# Patient Record
Sex: FEMALE | Race: WHITE | NOT HISPANIC OR LATINO | Employment: FULL TIME | URBAN - NONMETROPOLITAN AREA
[De-identification: names, ages, dates, MRNs, and addresses within clinical notes are randomized per-mention and may not be internally consistent; named-entity substitution may affect disease eponyms.]

---

## 2017-12-21 PROBLEM — K81.0 ACUTE CHOLECYSTITIS: Status: ACTIVE | Noted: 2017-12-21

## 2019-09-09 PROBLEM — Z11.51 SCREENING FOR HPV (HUMAN PAPILLOMAVIRUS): Status: ACTIVE | Noted: 2019-09-09

## 2019-09-09 PROBLEM — Z85.820 HX OF MELANOMA OF SKIN: Status: ACTIVE | Noted: 2019-09-09

## 2019-09-09 PROBLEM — Z01.419 WELL WOMAN EXAM: Status: ACTIVE | Noted: 2019-09-09

## 2020-09-21 PROBLEM — N95.0 POST-MENOPAUSE BLEEDING: Status: ACTIVE | Noted: 2020-09-21

## 2020-09-21 PROBLEM — Z56.6 WORK-RELATED STRESS: Status: ACTIVE | Noted: 2020-09-21

## 2020-09-21 PROBLEM — Z01.411 ENCOUNTER FOR WELL WOMAN EXAM WITH ABNORMAL FINDINGS: Status: ACTIVE | Noted: 2020-09-21

## 2020-09-29 PROBLEM — Z80.0 FAMILY HISTORY OF COLON CANCER IN MOTHER: Status: ACTIVE | Noted: 2020-09-29

## 2021-09-15 NOTE — PROGRESS NOTES
Subjective:   Chief Complaint:   Chief Complaint   Patient presents with   • Abnormal EKG     This evaluation was conducted via Zoom using secure and encrypted videoconferencing technology. The patient was in a private location in the state of [place].    The patient's identity was confirmed and verbal consent was obtained for this virtual visit.      Neelima Lares is a 61 y.o. female who is referred by JOYCE Correa for chest pain and abnormal ECG, SVT/PAT, PACs, PVCs, left carotid artery plaque, FH CAD.    She was feeling fatigued, trouble with energy.  PCP got ECG, saw runs of SVT.    In hindsight, she did note palpitations, fluttering in the chest.  Sometimes gets chest tightness up near her throat.  Recalls being under a lot of stress.  Tried to wear monitor, it did not work, wore another, showed frequent SVT/AT.  Had stress test also, no rhythm changes, just AMARAL, only 6 min.    Probably preHTN.    Had HLP, LDL is a little elevated.  Has had [facility] vascular screening, email said no significant PAD, just mild carotid artery plaque.    Not limited by chest pain, pressure or tightness with activity.   No significant dyspnea on exertion, orthopnea or lower extremity swelling.   No significant lightheadedness, or presyncope/syncope.   No symptoms of leg claudication.   No stroke/TIA like symptoms.    No family history of premature coronary artery disease.  Father had valve replacement, PM, has passed away.  Brother with MI/stents, age at 45.  Former smoker, quit before age 24.   No history of diabetes.  No history of autoimmune disease such as lupus or rheumatoid arthritis.  No chronic kidney disease.  No ETOH overuse.  No caffeine overuse. 2 cups of coffee daily.  No recreation substance use.  No hx asthma, possible reactive airway disease.    Lives in [place].  Working.  Son and granddaughter live with them part time, she is getting her granddaughter to [employer] for school.    DATA REVIEWED by  me:  ECG (my personal interpretation) 6/21/2021  Sinus, rate 75, runs of SVT    Heart monitor 7/26/2021, 24 hours  Average heart rate 81, range  bpm  10% PAC burden, 824 episodes of atrial tachycardia, up to 14 beats, rare PVCs. No symptoms reported.    Echo pending    Treadmill Mayur [place] 7-8-21  Went 6 min no ST changes, no arrhthymias, no ECG changes.  Did not AMARAL but no CP.  Normal BP response.      Most recent labs:       Lab Results   Component Value Date/Time    WBC 6.8 06/24/2021 08:17 AM    HEMOGLOBIN 15.3 06/24/2021 08:17 AM    HEMATOCRIT 48.1 06/24/2021 08:17 AM    MCV 88.6 06/24/2021 08:17 AM      Lab Results   Component Value Date/Time    SODIUM 141 06/24/2021 08:17 AM    POTASSIUM 4.7 06/24/2021 08:17 AM    CHLORIDE 107 06/24/2021 08:17 AM    CO2 25 06/24/2021 08:17 AM    GLUCOSE 99 06/24/2021 08:17 AM    BUN 22 (H) 06/24/2021 08:17 AM    CREATININE 0.8 06/24/2021 08:17 AM      Lab Results   Component Value Date/Time    ASTSGOT 17 06/24/2021 08:17 AM    ALTSGPT 24 06/24/2021 08:17 AM    ALBUMIN 3.7 06/24/2021 08:17 AM      Lab Results   Component Value Date/Time    CHOLSTRLTOT 194 06/24/2021 08:17 AM     (H) 06/24/2021 08:17 AM    HDL 49.0 06/24/2021 08:17 AM    TRIGLYCERIDE 118 06/24/2021 08:17 AM     No results for input(s): NTPROBNP, TROPONINT in the last 72 hours.      Past Medical History:   Diagnosis Date   • Anxiety    • Cancer (HCC)     melenoma ( 1987)    • Chickenpox    • Fatigue    • History of colonoscopy 2016     Past Surgical History:   Procedure Laterality Date   • JONATHAN BY LAPAROSCOPY  12/22/2017    Procedure: JONATHAN BY LAPAROSCOPY;  Surgeon: Josh Zelaya M.D.;  Location: SURGERY [facility];  Service: General   • TUBAL LIGATION  1995   • MOLE EXCISION      Malignant mole removal and skin graph in 1985     Family History   Problem Relation Age of Onset   • Cancer Mother 42   • Heart Disease Mother    • Heart Disease Father         Valve surgery, PM   • Heart Disease  Brother 48        stent X2   • No Known Problems Daughter    • No Known Problems Daughter    • No Known Problems Son    • No Known Problems Son      Social History     Socioeconomic History   • Marital status:      Spouse name: Not on file   • Number of children: Not on file   • Years of education: Not on file   • Highest education level: Not on file   Occupational History   • Not on file   Tobacco Use   • Smoking status: Former Smoker     Quit date: 2/10/1987     Years since quittin.6   • Smokeless tobacco: Never Used   Vaping Use   • Vaping Use: Never used   Substance and Sexual Activity   • Alcohol use: Yes     Alcohol/week: 0.6 - 1.2 oz     Types: 1 - 2 Glasses of wine per week     Comment: rarely    • Drug use: No   • Sexual activity: Not on file   Other Topics Concern   • Not on file   Social History Narrative   • Not on file     Social Determinants of Health     Financial Resource Strain:    • Difficulty of Paying Living Expenses:    Food Insecurity:    • Worried About Running Out of Food in the Last Year:    • Ran Out of Food in the Last Year:    Transportation Needs:    • Lack of Transportation (Medical):    • Lack of Transportation (Non-Medical):    Physical Activity:    • Days of Exercise per Week:    • Minutes of Exercise per Session:    Stress:    • Feeling of Stress :    Social Connections:    • Frequency of Communication with Friends and Family:    • Frequency of Social Gatherings with Friends and Family:    • Attends Yazdanism Services:    • Active Member of Clubs or Organizations:    • Attends Club or Organization Meetings:    • Marital Status:    Intimate Partner Violence:    • Fear of Current or Ex-Partner:    • Emotionally Abused:    • Physically Abused:    • Sexually Abused:      No Known Allergies    Current Outpatient Medications   Medication Sig Dispense Refill   • ibuprofen (MOTRIN) 200 MG Tab Take 200 mg by mouth every 6 hours as needed.     • metoprolol tartrate (LOPRESSOR) 25  "MG Tab Take 1 Tablet by mouth 2 times a day. 60 Tablet 11     No current facility-administered medications for this visit.       ROS  All others systems reviewed and negative.     Objective:     BP (!) 0/0 (BP Location: Left arm, Patient Position: Sitting, BP Cuff Size: Adult)   Resp 12   Ht 1.6 m (5' 3\")   Wt 81.6 kg (180 lb)  Body mass index is 31.89 kg/m².    Vitals obtained by patient:    Physical Exam:  General: No acute distress. Well nourished.   HEENT: EOM grossly intact, no scleral icterus, no pharyngeal erythema.  Phonation normal.  Neck:  No JVD noted at 90 degrees, trachea midline, no obvious masses, moves freely without pain.  CVS: Pulse as reported by patient, no visible LE edema.  Resp: Unlabored respiratory effort, no cough or audible wheeze  MSK/Ext: No clubbing or cyanosis visible appreciated.  Skin: No rashes in visible areas.  Neurological: CN III-XII grossly intact. No focal deficits.   Psych: A&O x 3, appropriate affect, good judgement, well groomed        Assessment:     1. Other chest pain     2. SVT (supraventricular tachycardia) (HCC)  EC-ECHOCARDIOGRAM COMPLETE W/O CONT    CANCELED: Cardiac Event Monitor   3. Nonspecific abnormal electrocardiogram (ECG) (EKG)     4. Other fatigue     5. Paroxysmal atrial tachycardia (HCC)     6. Premature atrial contraction     7. PVC (premature ventricular contraction)     8. Carotid artery plaque, left     9. Family history of coronary artery disease in brother         Medical Decision Making:  Today's Assessment / Status / Plan:     -Needs echo  -Meds for SVT/PAT, consider meds vs ablation  -Chest tightness probably related to SVT  -LDL is up, has carotid plaque, statin in recommended, corby with FH  -APN 4 weeks after trying meds    Written instructions given today:    -Your heart monitor showed SVT= supraventricular tachycardia.  This means a rhythm change coming from the top part of the heart.  Your type of SVT looked most consistent with a possible " "atrial tachycardia.    -In general, SVT and atrial tachycardia are not harmful but can cause significant symptoms.    -Our goal is to suppress the rhythm change so that you feel better.  The easiest way to do this is to take medications.  Another option would be ablation with an electrophysiologist cardiologist which is a surgical procedure intended to make the rhythm change go away so you will have to take medications.    -We typically start with low-dose metoprolol to suppress the palpitations.  Metoprolol can cause fatigue and lower blood pressure and slower heart rate.    -Another option is Cardizem, a different type of medication called calcium channel blocker.    -If you find the medication helpful then stay on it, if you have side effects and do not feel better taking it then do not take it.    -I am going to start a medication called metoprolol tartrate 25 mg a.m. and p.m.  This is a 12-hour medication.  It is entirely up to you whether you want to take it every day or as needed.  You can also try taking just half a tablet a.m. and p.m.    -The medication can be taken every day to suppress the heart palpitations or it can be taken as needed for what I call a \"bad palpitation day.\"  We call this a pill in pocket approach.    -You should not have significant side effects from the medications, they are not worth taking if you have significant side effects.    -On another note, your LDL cholesterol is elevated and if you have a little bit of plaque in your carotid artery, that is an indicator of vascular disease which can lead to heart attack like your brother had.  The treatment for this would be to start a low-dose cholesterol medication such as atorvastatin 10 mg once daily.  You can talk with Oralia about this.    -I can refer you to an electrophysiology cardiologist anytime to discuss ablation.  You can read about SVT and atrial tachycardia ablation online.    -Come back and see my nurse practitioner in 1 " to 2 months to see how you are doing on this medication.    -We do need an echocardiogram which is an ultrasound of the heart to show me the shape and structure of the heart to ensure you are dealing with a structurally normal heart.  This can be done in [place] or [facility] in about 4 weeks (around 10/15/2021).    It is my pleasure to participate in the care of Ms. Lares.  Please do not hesitate to contact me with questions or concerns.    Judith Vo MD, [facility]  Cardiologist [facility]    Please note that this dictation was created using voice recognition software. I have made every reasonable attempt to correct obvious errors, but it is possible there are errors of grammar and possibly content that I did not discover before finalizing the note.

## 2021-09-17 ENCOUNTER — TELEMEDICINE (OUTPATIENT)
Dept: CARDIOLOGY | Facility: CLINIC | Age: 61
End: 2021-09-17
Attending: NURSE PRACTITIONER
Payer: COMMERCIAL

## 2021-09-17 VITALS — HEIGHT: 63 IN | RESPIRATION RATE: 12 BRPM | WEIGHT: 180 LBS | BODY MASS INDEX: 31.89 KG/M2

## 2021-09-17 DIAGNOSIS — I47.10 SVT (SUPRAVENTRICULAR TACHYCARDIA) (HCC): ICD-10-CM

## 2021-09-17 DIAGNOSIS — Z82.49 FAMILY HISTORY OF CORONARY ARTERY DISEASE IN BROTHER: ICD-10-CM

## 2021-09-17 DIAGNOSIS — R94.31 NONSPECIFIC ABNORMAL ELECTROCARDIOGRAM (ECG) (EKG): ICD-10-CM

## 2021-09-17 DIAGNOSIS — I49.1 PREMATURE ATRIAL CONTRACTION: ICD-10-CM

## 2021-09-17 DIAGNOSIS — R07.89 OTHER CHEST PAIN: ICD-10-CM

## 2021-09-17 DIAGNOSIS — R53.83 OTHER FATIGUE: ICD-10-CM

## 2021-09-17 DIAGNOSIS — I47.19 PAROXYSMAL ATRIAL TACHYCARDIA (HCC): ICD-10-CM

## 2021-09-17 DIAGNOSIS — I65.22 CAROTID ARTERY PLAQUE, LEFT: ICD-10-CM

## 2021-09-17 DIAGNOSIS — I49.3 PVC (PREMATURE VENTRICULAR CONTRACTION): ICD-10-CM

## 2021-09-17 PROCEDURE — 99204 OFFICE O/P NEW MOD 45 MIN: CPT | Mod: 95 | Performed by: INTERNAL MEDICINE

## 2021-09-17 RX ORDER — IBUPROFEN 200 MG
200 TABLET ORAL EVERY 6 HOURS PRN
COMMUNITY

## 2021-09-17 ASSESSMENT — FIBROSIS 4 INDEX: FIB4 SCORE: 0.65

## 2021-09-17 NOTE — LETTER
[facility] [place] [address],   2nd Floor  [place] [address]  Phone: [phone]  Fax: [phone]              Neelima Lares  1960    Encounter Date: 9/17/2021    Judith Vo M.D.          PROGRESS NOTE:  Subjective:   Chief Complaint: No chief complaint on file.    This evaluation was conducted via Zoom using secure and encrypted videoconferencing technology. The patient was in a private location in the [facility].    The patient's identity was confirmed and verbal consent was obtained for this virtual visit.      Neelima Lares is a 61 y.o. female who is referred by JOYCE Correa for chest pain and abnormal ECG, SVT.    DATA REVIEWED by me:  ECG (my personal interpretation) 6/21/2021  Sinus, rate 75, runs of SVT    Echo pending    Most recent labs:       Lab Results   Component Value Date/Time    WBC 6.8 06/24/2021 08:17 AM    HEMOGLOBIN 15.3 06/24/2021 08:17 AM    HEMATOCRIT 48.1 06/24/2021 08:17 AM    MCV 88.6 06/24/2021 08:17 AM      Lab Results   Component Value Date/Time    SODIUM 141 06/24/2021 08:17 AM    POTASSIUM 4.7 06/24/2021 08:17 AM    CHLORIDE 107 06/24/2021 08:17 AM    CO2 25 06/24/2021 08:17 AM    GLUCOSE 99 06/24/2021 08:17 AM    BUN 22 (H) 06/24/2021 08:17 AM    CREATININE 0.8 06/24/2021 08:17 AM      Lab Results   Component Value Date/Time    ASTSGOT 17 06/24/2021 08:17 AM    ALTSGPT 24 06/24/2021 08:17 AM    ALBUMIN 3.7 06/24/2021 08:17 AM      Lab Results   Component Value Date/Time    CHOLSTRLTOT 194 06/24/2021 08:17 AM     (H) 06/24/2021 08:17 AM    HDL 49.0 06/24/2021 08:17 AM    TRIGLYCERIDE 118 06/24/2021 08:17 AM     No results for input(s): NTPROBNP, TROPONINT in the last 72 hours.      Past Medical History:   Diagnosis Date   • Anxiety    • Cancer (HCC)     melenoma ( 1987)    • Chickenpox    • Fatigue    • History of colonoscopy 2016     Past Surgical History:   Procedure  Laterality Date   • JONATHAN BY LAPAROSCOPY  2017    Procedure: JONATHAN BY LAPAROSCOPY;  Surgeon: Josh Zelaya M.D.;  Location: SURGERY [facility];  Service: General   • TUBAL LIGATION     • MOLE EXCISION      Malignant mole removal and skin graph in      Family History   Problem Relation Age of Onset   • Cancer Mother 42   • Heart Disease Mother    • Heart Disease Brother 45        stint X2   • No Known Problems Daughter    • No Known Problems Daughter    • No Known Problems Son    • No Known Problems Son      Social History     Socioeconomic History   • Marital status:      Spouse name: Not on file   • Number of children: Not on file   • Years of education: Not on file   • Highest education level: Not on file   Occupational History   • Not on file   Tobacco Use   • Smoking status: Former Smoker     Quit date: 2/10/1987     Years since quittin.6   • Smokeless tobacco: Never Used   Vaping Use   • Vaping Use: Never used   Substance and Sexual Activity   • Alcohol use: Yes     Alcohol/week: 0.6 - 1.2 oz     Types: 1 - 2 Glasses of wine per week     Comment: rarely    • Drug use: No   • Sexual activity: Not on file   Other Topics Concern   • Not on file   Social History Narrative   • Not on file     Social Determinants of Health     Financial Resource Strain:    • Difficulty of Paying Living Expenses:    Food Insecurity:    • Worried About Running Out of Food in the Last Year:    • Ran Out of Food in the Last Year:    Transportation Needs:    • Lack of Transportation (Medical):    • Lack of Transportation (Non-Medical):    Physical Activity:    • Days of Exercise per Week:    • Minutes of Exercise per Session:    Stress:    • Feeling of Stress :    Social Connections:    • Frequency of Communication with Friends and Family:    • Frequency of Social Gatherings with Friends and Family:    • Attends Mandaeism Services:    • Active Member of Clubs or Organizations:    • Attends Club or  Organization Meetings:    • Marital Status:    Intimate Partner Violence:    • Fear of Current or Ex-Partner:    • Emotionally Abused:    • Physically Abused:    • Sexually Abused:      No Known Allergies    No current outpatient medications on file.     No current facility-administered medications for this visit.       ROS  All others systems reviewed and negative.     Objective:     There were no vitals taken for this visit. There is no height or weight on file to calculate BMI.    Vitals obtained by patient:    Physical Exam:  General: No acute distress. Well nourished.   HEENT: EOM grossly intact, no scleral icterus, no pharyngeal erythema.  Phonation normal.  Neck:  No JVD noted at 90 degrees, trachea midline, no obvious masses, moves freely without pain.  CVS: Pulse as reported by patient, no visible LE edema.  Resp: Unlabored respiratory effort, no cough or audible wheeze  MSK/Ext: No clubbing or cyanosis visible appreciated.  Skin: No rashes in visible areas.  Neurological: CN III-XII grossly intact. No focal deficits.   Psych: A&O x 3, appropriate affect, good judgement, well groomed        Assessment:     No diagnosis found.    Medical Decision Making:  Today's Assessment / Status / Plan:     -Needs echo, zio  -APN 4 weeks after eval          Written instructions given today:        No follow-ups on file.    It is my pleasure to participate in the care of Ms. Lares.  Please do not hesitate to contact me with questions or concerns.    Judith Vo MD, [facility]  Cardiologist [facility]    Please note that this dictation was created using voice recognition software. I have made every reasonable attempt to correct obvious errors, but it is possible there are errors of grammar and possibly content that I did not discover before finalizing the note.          Oralia Marie, ALEXRManojN.  [address] [facility] [address]  Via In Basket

## 2021-09-17 NOTE — PATIENT INSTRUCTIONS
"-Your heart monitor showed SVT= supraventricular tachycardia.  This means a rhythm change coming from the top part of the heart.  Your type of SVT looked most consistent with a possible atrial tachycardia.    -In general, SVT and atrial tachycardia are not harmful but can cause significant symptoms.    -Our goal is to suppress the rhythm change so that you feel better.  The easiest way to do this is to take medications.  Another option would be ablation with an electrophysiologist cardiologist which is a surgical procedure intended to make the rhythm change go away so you will have to take medications.    -We typically start with low-dose metoprolol to suppress the palpitations.  Metoprolol can cause fatigue and lower blood pressure and slower heart rate.    -Another option is Cardizem, a different type of medication called calcium channel blocker.    -If you find the medication helpful then stay on it, if you have side effects and do not feel better taking it then do not take it.    -I am going to start a medication called metoprolol tartrate 25 mg a.m. and p.m.  This is a 12-hour medication.  It is entirely up to you whether you want to take it every day or as needed.  You can also try taking just half a tablet a.m. and p.m.    -Your starting dose is very low.  Maximum dose is 100 mg a.m. and p.m. just to put in perspective.    -The medication can be taken every day to suppress the heart palpitations or it can be taken as needed for what I call a \"bad palpitation day.\"  We call this a pill in pocket approach.    -You should not have significant side effects from the medications, they are not worth taking if you have significant side effects.    -On another note, your LDL cholesterol is elevated and if you have a little bit of plaque in your carotid artery, that is an indicator of vascular disease which can lead to heart attack like your brother had.  The treatment for this would be to start a low-dose cholesterol " medication such as atorvastatin 10 mg once daily.  You can talk with Oralia about this.    -I can refer you to an electrophysiology cardiologist anytime to discuss ablation.  You can read about SVT and atrial tachycardia ablation online.    -Come back and see my nurse practitioner in 1 to 2 months to see how you are doing on this medication.    -We do need an echocardiogram which is an ultrasound of the heart to show me the shape and structure of the heart to ensure you are dealing with a structurally normal heart.  This can be done in [place] or [place]

## 2021-10-27 ENCOUNTER — OFFICE VISIT (OUTPATIENT)
Dept: CARDIOLOGY | Facility: PHYSICIAN GROUP | Age: 61
End: 2021-10-27
Payer: COMMERCIAL

## 2021-10-27 VITALS
RESPIRATION RATE: 16 BRPM | OXYGEN SATURATION: 95 % | BODY MASS INDEX: 32.25 KG/M2 | WEIGHT: 182 LBS | HEART RATE: 65 BPM | HEIGHT: 63 IN | SYSTOLIC BLOOD PRESSURE: 102 MMHG | DIASTOLIC BLOOD PRESSURE: 62 MMHG

## 2021-10-27 DIAGNOSIS — E78.5 BORDERLINE HYPERLIPIDEMIA: ICD-10-CM

## 2021-10-27 DIAGNOSIS — I47.10 SVT (SUPRAVENTRICULAR TACHYCARDIA) (HCC): ICD-10-CM

## 2021-10-27 DIAGNOSIS — R00.2 PALPITATIONS: ICD-10-CM

## 2021-10-27 PROCEDURE — 99214 OFFICE O/P EST MOD 30 MIN: CPT | Performed by: NURSE PRACTITIONER

## 2021-10-27 RX ORDER — METOPROLOL TARTRATE 50 MG/1
25 TABLET, FILM COATED ORAL 2 TIMES DAILY
Qty: 100 TABLET | Refills: 3
Start: 2021-10-27 | End: 2022-12-29 | Stop reason: SDUPTHER

## 2021-10-27 ASSESSMENT — ENCOUNTER SYMPTOMS
NAUSEA: 0
FEVER: 0
ORTHOPNEA: 0
PALPITATIONS: 0
BRUISES/BLEEDS EASILY: 0
CHILLS: 0
MYALGIAS: 0
COUGH: 0
LOSS OF CONSCIOUSNESS: 0
SHORTNESS OF BREATH: 0
DIZZINESS: 0
PND: 0
HEADACHES: 0
ABDOMINAL PAIN: 0

## 2021-10-27 ASSESSMENT — FIBROSIS 4 INDEX: FIB4 SCORE: 0.65

## 2021-10-27 NOTE — PROGRESS NOTES
Chief Complaint   Patient presents with   • Follow-Up   • Palpitations   • Supraventricular Tachycardia (SVT)   • Evaluation Of Medication Change       Subjective     Neelima Lares is a 61 y.o. female who presents today for one month follow-up of palpitations/SVT and medication addition evaluation.    Neelima is a 61 year old female who first saw Dr. Vo via Telemedicine on 9/17/2021 for evaluation of palpitations/SVT documented on holter monitor. This all seemed to start after full Covid vaccination in January/February 2021. Low dose Metoprolol 25mg BID was added; echocardiogram was also ordered. She is here today for follow-up.    Generally, she is doing well. At first, she tried taking Metoprolol AM and PM, but it kept her up at night. She is now taking 25mg AM and noon, and this seems to keep things under control. No chest pain, pressure or discomfort; breathing is improved, with no further shortness of breath. No orthopnea or PND; no dizziness or syncope; no LE edema.    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 She also wants to try to lose some weight. She only drinks 1-2 cups of coffee per day; no other stimulants or alcohol use.    Past Medical History:   Diagnosis Date   • Anxiety    • Chickenpox    • Fatigue    • History of colonoscopy 2016   • Palpitations    • Skin cancer (melanoma) ([facility]) 1987   • SVT (supraventricular tachycardia) ([facility])      Past Surgical History:   Procedure Laterality Date   • JONATHAN BY LAPAROSCOPY  12/22/2017    Procedure: JONATHAN BY LAPAROSCOPY;  Surgeon: Josh Zelaya M.D.;  Location: SURGERY [facility];  Service: General   • TUBAL LIGATION  1995   • MOLE EXCISION      Malignant mole removal and skin graph in 1985     Family History   Problem Relation Age of Onset   • Cancer Mother 42   • Heart Disease Mother    • Heart Disease Father         Valve surgery, PM   •  Heart Disease Brother 48        stent X2   • No Known Problems Daughter    • No Known Problems Daughter    • No Known Problems Son    • No Known Problems Son      Social History     Socioeconomic History   • Marital status:      Spouse name: Not on file   • Number of children: Not on file   • Years of education: Not on file   • Highest education level: Not on file   Occupational History   • Not on file   Tobacco Use   • Smoking status: Former Smoker     Quit date: 2/10/1987     Years since quittin.7   • Smokeless tobacco: Never Used   Vaping Use   • Vaping Use: Never used   Substance and Sexual Activity   • Alcohol use: Yes     Alcohol/week: 0.6 - 1.2 oz     Types: 1 - 2 Glasses of wine per week     Comment: rarely    • Drug use: No   • Sexual activity: Not on file   Other Topics Concern   • Not on file   Social History Narrative   • Not on file     Social Determinants of Health     Financial Resource Strain:    • Difficulty of Paying Living Expenses:    Food Insecurity:    • Worried About Running Out of Food in the Last Year:    • Ran Out of Food in the Last Year:    Transportation Needs:    • Lack of Transportation (Medical):    • Lack of Transportation (Non-Medical):    Physical Activity:    • Days of Exercise per Week:    • Minutes of Exercise per Session:    Stress:    • Feeling of Stress :    Social Connections:    • Frequency of Communication with Friends and Family:    • Frequency of Social Gatherings with Friends and Family:    • Attends Spiritism Services:    • Active Member of Clubs or Organizations:    • Attends Club or Organization Meetings:    • Marital Status:    Intimate Partner Violence:    • Fear of Current or Ex-Partner:    • Emotionally Abused:    • Physically Abused:    • Sexually Abused:      No Known Allergies  Outpatient Encounter Medications as of 10/27/2021   Medication Sig Dispense Refill   • metoprolol tartrate (LOPRESSOR) 50 MG Tab Take 0.5 Tablets by mouth 2 times a day.  "100 Tablet 3   • ibuprofen (MOTRIN) 200 MG Tab Take 200 mg by mouth every 6 hours as needed.     • [DISCONTINUED] metoprolol tartrate (LOPRESSOR) 25 MG Tab Take 1 Tablet by mouth 2 times a day. (Patient taking differently: Take 25 mg by mouth 2 times a day. Patient taking 1/2 tab BID for total of 25MG daily) 60 Tablet 11     No facility-administered encounter medications on file as of 10/27/2021.     Review of Systems   Constitutional: Negative for chills and fever.   HENT: Negative for congestion.    Respiratory: Negative for cough and shortness of breath.    Cardiovascular: Negative for chest pain, palpitations, orthopnea, leg swelling and PND.   Gastrointestinal: Negative for abdominal pain and nausea.   Musculoskeletal: Negative for myalgias.   Skin: Negative for rash.   Neurological: Negative for dizziness, loss of consciousness and headaches.   Endo/Heme/Allergies: Does not bruise/bleed easily.              Objective     /62 (BP Location: Left arm, Patient Position: Sitting, BP Cuff Size: Adult)   Pulse 65   Resp 16   Ht 1.6 m (5' 3\")   Wt 82.6 kg (182 lb)   SpO2 95%   BMI 32.24 kg/m²     Physical Exam  Constitutional:       Appearance: She is well-developed.   HENT:      Head: Normocephalic.   Neck:      Vascular: No JVD.   Cardiovascular:      Rate and Rhythm: Normal rate and regular rhythm.      Heart sounds: Normal heart sounds.   Pulmonary:      Effort: Pulmonary effort is normal. No respiratory distress.      Breath sounds: Normal breath sounds. No wheezing or rales.   Abdominal:      General: Bowel sounds are normal. There is no distension.      Palpations: Abdomen is soft.      Tenderness: There is no abdominal tenderness.   Musculoskeletal:         General: Normal range of motion.      Cervical back: Normal range of motion and neck supple.   Skin:     General: Skin is warm and dry.      Findings: No rash.   Neurological:      Mental Status: She is alert and oriented to person, place, and " time.     RESULTS OF HOLTER MONITOR OF 7/26/20216:  1. Predominant rhythm was normal sinus rhythm (NSR) with an average HR of 81bpm.  2. Sinus tachycardia (ST) = 141bpm at 10:27am (Tues, 27th)  3. Sinus minimum rate = 60bpm at 4:42am (Tues, 27th)  4. PACs = 12,628 (10%) (Isolated = 6453, Couplets = 1651)  5. 824 episodes of atrial tachycardia (AT)   Longest run 14 beats at 144bpm   Fastest run 4 beats at 201bpm  6. Unifocal PVCs = 4 (<1%), no couplets, no VT  7. No symptoms    CONCLUSIONS OF ECHOCARDIOGRAM OF 9/28/2021:  The left ventricular ejection fraction is visually estimated to be 65%.  Estimated right ventricular systolic pressure is 35 mmHg.  No prior study is available for comparison    10 year ASCVD risk: 2.75%     Component      Latest Ref Rng & Units 6/29/2021           8:10 AM   TSH      0.36 - 3.74 uIU/mL 2.49     Lab Results   Component Value Date/Time    WBC 6.8 06/24/2021 08:17 AM    RBC 5.43 (H) 06/24/2021 08:17 AM    HEMOGLOBIN 15.3 06/24/2021 08:17 AM    HEMATOCRIT 48.1 06/24/2021 08:17 AM    MCV 88.6 06/24/2021 08:17 AM    MCH 28.2 06/24/2021 08:17 AM    MCHC 31.8 (L) 06/24/2021 08:17 AM    MPV 11.1 (H) 06/24/2021 08:17 AM      Lab Results   Component Value Date/Time    CHOLSTRLTOT 194 06/24/2021 08:17 AM     (H) 06/24/2021 08:17 AM    HDL 49.0 06/24/2021 08:17 AM    TRIGLYCERIDE 118 06/24/2021 08:17 AM       Lab Results   Component Value Date/Time    SODIUM 141 06/24/2021 08:17 AM    POTASSIUM 4.7 06/24/2021 08:17 AM    CHLORIDE 107 06/24/2021 08:17 AM    CO2 25 06/24/2021 08:17 AM    GLUCOSE 99 06/24/2021 08:17 AM    BUN 22 (H) 06/24/2021 08:17 AM    CREATININE 0.8 06/24/2021 08:17 AM    BUNCREATRAT 32 (H) 09/27/2019 04:55 AM     Lab Results   Component Value Date/Time    ALKPHOSPHAT 103 06/24/2021 08:17 AM    ASTSGOT 17 06/24/2021 08:17 AM    ALTSGPT 24 06/24/2021 08:17 AM    TBILIRUBIN 0.5 06/24/2021 08:17 AM        Assessment & Plan     1. SVT (supraventricular tachycardia) (HCC)   metoprolol tartrate (LOPRESSOR) 50 MG Tab   2. Palpitations  metoprolol tartrate (LOPRESSOR) 50 MG Tab   3. Borderline hyperlipidemia         Medical Decision Making: Today's Assessment/Status/Plan:      1. Palpitations/SVT, improved on Metoprolol 25mg BID. She can continue with this dose OR try using it PRN. She can also use additional 12.5-25mg PRN sustained palpitations. Reviewed echocardiogram and she is reassured everything is normal.    2. Borderline hyperlipidemia. 10 year ASCVD risk is 2.75%. Discussed trying to incorporate more exercise, and suggested some minor dietary changes she can make.    Same medications for now. Follow-up with Dr. Vo in 6 months, sooner if clinical condition changes.

## 2023-08-24 PROBLEM — I10 PRIMARY HYPERTENSION: Status: ACTIVE | Noted: 2023-08-24

## 2024-10-21 ENCOUNTER — RESEARCH ENCOUNTER (OUTPATIENT)
Dept: RESEARCH | Facility: MEDICAL CENTER | Age: 64
End: 2024-10-21

## 2024-10-21 DIAGNOSIS — Z00.6 CLINICAL TRIAL PARTICIPANT: ICD-10-CM

## 2024-11-12 PROBLEM — N95.0 POSTMENOPAUSAL BLEEDING: Status: ACTIVE | Noted: 2024-11-12

## 2024-11-12 PROBLEM — N84.0 ENDOMETRIAL POLYP: Status: ACTIVE | Noted: 2024-11-12

## 2024-11-19 ENCOUNTER — HOSPITAL ENCOUNTER (OUTPATIENT)
Dept: LAB | Facility: MEDICAL CENTER | Age: 64
End: 2024-11-19
Attending: FAMILY MEDICINE
Payer: COMMERCIAL

## 2024-11-19 ENCOUNTER — HOSPITAL ENCOUNTER (OUTPATIENT)
Dept: LAB | Facility: MEDICAL CENTER | Age: 64
End: 2024-11-19
Attending: OBSTETRICS & GYNECOLOGY
Payer: COMMERCIAL

## 2024-11-19 DIAGNOSIS — N83.202 LEFT OVARIAN CYST: ICD-10-CM

## 2024-11-19 DIAGNOSIS — Z00.6 CLINICAL TRIAL PARTICIPANT: ICD-10-CM

## 2024-11-19 LAB — CANCER AG125 SERPL-ACNC: 18.1 U/ML (ref 0–35)

## 2024-11-19 PROCEDURE — 86304 IMMUNOASSAY TUMOR CA 125: CPT

## 2024-11-25 LAB
ELF SCORE: 9.24 -
HA (HYALURONIC ACID): 70.12 NG/ML
PIIINP (AMINO-TERMINAL PROPEPTIDE): 5.34 NG/ML
RELATIVE RISK: NORMAL
RISK GROUP: NORMAL
RISK: 3.3 %
TIMP-1 (TISSUE INHIBITOR OF MMP1): 199.2 NG/ML

## 2024-12-06 LAB
APOB+LDLR+PCSK9 GENE MUT ANL BLD/T: NOT DETECTED
BRCA1+BRCA2 DEL+DUP + FULL MUT ANL BLD/T: NOT DETECTED
MLH1+MSH2+MSH6+PMS2 GN DEL+DUP+FUL M: NOT DETECTED

## 2025-01-09 PROBLEM — N83.209 CYST OF OVARY: Status: ACTIVE | Noted: 2024-11-12

## 2025-03-13 PROBLEM — Z01.411 ENCOUNTER FOR WELL WOMAN EXAM WITH ABNORMAL FINDINGS: Status: RESOLVED | Noted: 2020-09-21 | Resolved: 2025-03-13

## 2025-03-13 PROBLEM — N95.0 POSTMENOPAUSAL BLEEDING: Status: RESOLVED | Noted: 2024-11-12 | Resolved: 2025-03-13

## 2025-03-13 PROBLEM — N83.209 CYST OF OVARY: Status: RESOLVED | Noted: 2024-11-12 | Resolved: 2025-03-13

## 2025-03-13 PROBLEM — Z01.419 WELL WOMAN EXAM: Status: RESOLVED | Noted: 2019-09-09 | Resolved: 2025-03-13
